# Patient Record
Sex: FEMALE | Race: WHITE
[De-identification: names, ages, dates, MRNs, and addresses within clinical notes are randomized per-mention and may not be internally consistent; named-entity substitution may affect disease eponyms.]

---

## 2019-09-23 ENCOUNTER — HOSPITAL ENCOUNTER (EMERGENCY)
Dept: HOSPITAL 26 - MED | Age: 24
LOS: 1 days | Discharge: HOME | End: 2019-09-24
Payer: SELF-PAY

## 2019-09-23 VITALS — SYSTOLIC BLOOD PRESSURE: 123 MMHG | DIASTOLIC BLOOD PRESSURE: 73 MMHG

## 2019-09-23 VITALS — BODY MASS INDEX: 27.82 KG/M2 | HEIGHT: 63 IN | WEIGHT: 157 LBS

## 2019-09-23 DIAGNOSIS — O26.892: Primary | ICD-10-CM

## 2019-09-23 DIAGNOSIS — M54.5: ICD-10-CM

## 2019-09-23 DIAGNOSIS — Z3A.24: ICD-10-CM

## 2019-09-24 VITALS — DIASTOLIC BLOOD PRESSURE: 73 MMHG | SYSTOLIC BLOOD PRESSURE: 123 MMHG

## 2019-09-24 NOTE — NUR
Patient discharged with v/s stable. Written and verbal after care instructions 
given and explained. 

Patient verbalized understanding. Ambulatory with steady gait. All questions 
addressed prior to discharge. Advised to follow up with OBGYN.

## 2019-09-24 NOTE — NUR
PT CAME TO ER C/O OF LOWER BACK PAIN AND HEADACHE X 1 DAY. PT DENIES INJURY OR 
TRAUMA. PT IS 24 WEEKS PREGNANT. LMP 4/7/19. PAIN LEVEL 8/10, ACHING. DENIES 
BURNING UPON URINATION. NKA. NO MED HX. SAFETY MEASURES IN PLACE. WAITING FOR 
ERMD TO EVALUATE PT.